# Patient Record
Sex: MALE | Race: BLACK OR AFRICAN AMERICAN | NOT HISPANIC OR LATINO | Employment: UNEMPLOYED | URBAN - METROPOLITAN AREA
[De-identification: names, ages, dates, MRNs, and addresses within clinical notes are randomized per-mention and may not be internally consistent; named-entity substitution may affect disease eponyms.]

---

## 2023-05-26 ENCOUNTER — ANCILLARY PROCEDURE (OUTPATIENT)
Dept: GENERAL RADIOLOGY | Facility: CLINIC | Age: 58
End: 2023-05-26
Attending: STUDENT IN AN ORGANIZED HEALTH CARE EDUCATION/TRAINING PROGRAM
Payer: COMMERCIAL

## 2023-05-26 ENCOUNTER — OFFICE VISIT (OUTPATIENT)
Dept: FAMILY MEDICINE | Facility: CLINIC | Age: 58
End: 2023-05-26
Payer: COMMERCIAL

## 2023-05-26 VITALS
SYSTOLIC BLOOD PRESSURE: 162 MMHG | TEMPERATURE: 97.9 F | OXYGEN SATURATION: 94 % | HEIGHT: 73 IN | RESPIRATION RATE: 12 BRPM | HEART RATE: 66 BPM | WEIGHT: 262.6 LBS | BODY MASS INDEX: 34.8 KG/M2 | DIASTOLIC BLOOD PRESSURE: 101 MMHG

## 2023-05-26 DIAGNOSIS — R91.1 NODULE OF LOWER LOBE OF RIGHT LUNG: ICD-10-CM

## 2023-05-26 DIAGNOSIS — G89.29 CHRONIC BILATERAL LOW BACK PAIN, UNSPECIFIED WHETHER SCIATICA PRESENT: ICD-10-CM

## 2023-05-26 DIAGNOSIS — Z76.0 MEDICATION REFILL: ICD-10-CM

## 2023-05-26 DIAGNOSIS — M54.50 CHRONIC BILATERAL LOW BACK PAIN, UNSPECIFIED WHETHER SCIATICA PRESENT: ICD-10-CM

## 2023-05-26 DIAGNOSIS — E78.5 HYPERLIPIDEMIA LDL GOAL <70: ICD-10-CM

## 2023-05-26 DIAGNOSIS — F51.01 PRIMARY INSOMNIA: ICD-10-CM

## 2023-05-26 DIAGNOSIS — I10 BENIGN ESSENTIAL HYPERTENSION: Primary | ICD-10-CM

## 2023-05-26 DIAGNOSIS — J45.20 INTERMITTENT ASTHMA WITHOUT COMPLICATION, UNSPECIFIED ASTHMA SEVERITY: ICD-10-CM

## 2023-05-26 PROBLEM — R79.89 ELEVATED TROPONIN: Status: ACTIVE | Noted: 2022-09-14

## 2023-05-26 PROBLEM — R07.9 ACUTE CHEST PAIN: Status: ACTIVE | Noted: 2022-09-14

## 2023-05-26 PROBLEM — R06.02 SOB (SHORTNESS OF BREATH): Status: ACTIVE | Noted: 2022-09-14

## 2023-05-26 PROBLEM — Z91.199 NON-ADHERENCE TO MEDICAL TREATMENT: Status: ACTIVE | Noted: 2022-09-14

## 2023-05-26 PROBLEM — I16.1 HYPERTENSIVE EMERGENCY: Status: ACTIVE | Noted: 2022-09-14

## 2023-05-26 LAB
ALBUMIN SERPL BCG-MCNC: 4.3 G/DL (ref 3.5–5.2)
ALP SERPL-CCNC: 89 U/L (ref 40–129)
ALT SERPL W P-5'-P-CCNC: 28 U/L (ref 10–50)
ANION GAP SERPL CALCULATED.3IONS-SCNC: 12 MMOL/L (ref 7–15)
AST SERPL W P-5'-P-CCNC: 40 U/L (ref 10–50)
BILIRUB SERPL-MCNC: 0.5 MG/DL
BUN SERPL-MCNC: 14.1 MG/DL (ref 8–23)
CALCIUM SERPL-MCNC: 10.1 MG/DL (ref 8.8–10.2)
CHLORIDE SERPL-SCNC: 104 MMOL/L (ref 98–107)
CHOLEST SERPL-MCNC: 153 MG/DL
CREAT SERPL-MCNC: 1.5 MG/DL (ref 0.67–1.17)
DEPRECATED HCO3 PLAS-SCNC: 22 MMOL/L (ref 22–29)
GFR SERPL CREATININE-BSD FRML MDRD: 53 ML/MIN/1.73M2
GLUCOSE SERPL-MCNC: 125 MG/DL (ref 70–99)
HBA1C MFR BLD: 6.4 % (ref 0–5.6)
HDLC SERPL-MCNC: 40 MG/DL
LDLC SERPL CALC-MCNC: 78 MG/DL
NONHDLC SERPL-MCNC: 113 MG/DL
POTASSIUM SERPL-SCNC: 4.5 MMOL/L (ref 3.4–5.3)
PROT SERPL-MCNC: 7.3 G/DL (ref 6.4–8.3)
SODIUM SERPL-SCNC: 138 MMOL/L (ref 136–145)
TRIGL SERPL-MCNC: 177 MG/DL

## 2023-05-26 PROCEDURE — 72100 X-RAY EXAM L-S SPINE 2/3 VWS: CPT | Mod: TC | Performed by: RADIOLOGY

## 2023-05-26 PROCEDURE — 36415 COLL VENOUS BLD VENIPUNCTURE: CPT

## 2023-05-26 PROCEDURE — 99204 OFFICE O/P NEW MOD 45 MIN: CPT | Mod: GC

## 2023-05-26 PROCEDURE — 83036 HEMOGLOBIN GLYCOSYLATED A1C: CPT

## 2023-05-26 PROCEDURE — 80061 LIPID PANEL: CPT

## 2023-05-26 PROCEDURE — 80053 COMPREHEN METABOLIC PANEL: CPT

## 2023-05-26 RX ORDER — POLYETHYLENE GLYCOL 3350 17 G
2 POWDER IN PACKET (EA) ORAL
COMMUNITY
Start: 2022-09-14

## 2023-05-26 RX ORDER — DILTIAZEM HYDROCHLORIDE 60 MG/1
2 TABLET, FILM COATED ORAL 2 TIMES DAILY
Qty: 10.2 G | Refills: 1 | Status: SHIPPED | OUTPATIENT
Start: 2023-05-26 | End: 2024-02-15

## 2023-05-26 RX ORDER — CALCIUM CARBONATE 500(1250)
500 TABLET,CHEWABLE ORAL
COMMUNITY
Start: 2023-05-12

## 2023-05-26 RX ORDER — ATORVASTATIN CALCIUM 80 MG/1
40 TABLET, FILM COATED ORAL
COMMUNITY
Start: 2023-05-25 | End: 2023-05-26

## 2023-05-26 RX ORDER — ALBUTEROL SULFATE 90 UG/1
1-2 AEROSOL, METERED RESPIRATORY (INHALATION) EVERY 4 HOURS PRN
Qty: 18 G | Refills: 3 | Status: SHIPPED | OUTPATIENT
Start: 2023-05-26 | End: 2024-02-15

## 2023-05-26 RX ORDER — TRAZODONE HYDROCHLORIDE 50 MG/1
50 TABLET, FILM COATED ORAL
COMMUNITY
Start: 2023-05-12 | End: 2023-05-26

## 2023-05-26 RX ORDER — METOPROLOL SUCCINATE 25 MG/1
50 TABLET, EXTENDED RELEASE ORAL DAILY
Qty: 90 TABLET | Refills: 1 | Status: SHIPPED | OUTPATIENT
Start: 2023-05-26 | End: 2023-07-05

## 2023-05-26 RX ORDER — IBUPROFEN 600 MG/1
600 TABLET, FILM COATED ORAL EVERY 6 HOURS PRN
Qty: 30 TABLET | Refills: 1 | Status: SHIPPED | OUTPATIENT
Start: 2023-05-26

## 2023-05-26 RX ORDER — IBUPROFEN 600 MG/1
600 TABLET, FILM COATED ORAL EVERY 8 HOURS PRN
Qty: 60 UNITS | Refills: 0 | Status: SHIPPED | OUTPATIENT
Start: 2023-05-26 | End: 2023-05-26

## 2023-05-26 RX ORDER — NICOTINE 21 MG/24HR
1 PATCH, TRANSDERMAL 24 HOURS TRANSDERMAL
COMMUNITY
Start: 2022-09-15

## 2023-05-26 RX ORDER — LIDOCAINE 4 G/G
1-3 PATCH TOPICAL
COMMUNITY
Start: 2022-07-22

## 2023-05-26 RX ORDER — LISINOPRIL 20 MG/1
20 TABLET ORAL DAILY
Qty: 90 TABLET | Refills: 1 | Status: SHIPPED | OUTPATIENT
Start: 2023-05-26 | End: 2023-11-13

## 2023-05-26 RX ORDER — ATORVASTATIN CALCIUM 80 MG/1
40 TABLET, FILM COATED ORAL DAILY
Qty: 90 TABLET | Refills: 1 | Status: SHIPPED | OUTPATIENT
Start: 2023-05-26 | End: 2023-09-19

## 2023-05-26 RX ORDER — IBUPROFEN 600 MG/1
600 TABLET, FILM COATED ORAL EVERY 8 HOURS PRN
Qty: 90 UNITS | Refills: 1 | Status: SHIPPED | OUTPATIENT
Start: 2023-05-26 | End: 2023-05-26

## 2023-05-26 RX ORDER — ACETAMINOPHEN 325 MG/1
2 TABLET ORAL EVERY 4 HOURS PRN
COMMUNITY
Start: 2023-05-25 | End: 2024-02-15

## 2023-05-26 RX ORDER — METOPROLOL SUCCINATE 25 MG/1
50 TABLET, EXTENDED RELEASE ORAL
COMMUNITY
Start: 2023-05-25 | End: 2023-05-26

## 2023-05-26 RX ORDER — NITROGLYCERIN 0.4 MG/1
0.4 TABLET SUBLINGUAL
COMMUNITY
Start: 2022-09-14 | End: 2023-05-26

## 2023-05-26 RX ORDER — TRAZODONE HYDROCHLORIDE 50 MG/1
50 TABLET, FILM COATED ORAL AT BEDTIME
Qty: 90 TABLET | Refills: 1 | Status: SHIPPED | OUTPATIENT
Start: 2023-05-26 | End: 2023-11-13

## 2023-05-26 RX ORDER — NITROGLYCERIN 0.4 MG/1
0.4 TABLET SUBLINGUAL EVERY 5 MIN PRN
Qty: 10 TABLET | Refills: 1 | Status: SHIPPED | OUTPATIENT
Start: 2023-05-26 | End: 2024-02-15

## 2023-05-26 RX ORDER — AMLODIPINE BESYLATE 10 MG/1
10 TABLET ORAL DAILY
Qty: 90 TABLET | Refills: 1 | Status: SHIPPED | OUTPATIENT
Start: 2023-05-26 | End: 2023-11-13

## 2023-05-26 NOTE — PROGRESS NOTES
"Assessment & Plan:    1.  HTN  Hx of MI  Has been intermittently running out of meds due to taking additional at home. He checks his bp and will do an extra dose if more than 150 systolic. Will titrate up Lisinopril before adding additional agent.   Hx of prior MI \"a few years ago\" in [place]. Has not seen cardiology.  -Discussed he needs to follow up with Cardiology and is likely due for an ECHO and stress test. If he is going back to [place] soon, he should do this there. If he is staying here, would order it to be done. He will let me know at follow up appt 6/5 what he prefers.  -Increase Lisinopril to 20 mg daily  -refill amlodipine 10 mg daily, refill Metoprolol 50 mg XR daily  -BMP, HbA1c   -Follow up scheduled for 6/5/23     2. HLD  No lipid panel on file. He is taking 0.5 tabs so 40 mg daily of Atorvastatin.  -Lipid panel   -HbA1c    3. Asthma  Reports poorly controlled asthma recently with needing to use Albuterol >4 times per week and night time coughing.  -Start Symbicort inhaler 1-2 puffs up to 12 times daily  -Will reorder albuterol in case he is unable to obtain Symbicort    4.  Right lung nodule on previous CXR  Tobacco Use   Smokes 5 cigarettes per day for past 40+ years. CXR from 09/2022 showed right lung nodule that needed follow up CT, has not done this yet.  -CT Chest follow up nodule     5. Insomnia  -Refill trazodone 50 mg at bedtime     6. Chronic lumbar back pain  Has had worsening lower back pain for past year Had a fall previously and really never got back to baseline. May need MRI in future with his pain radiating down his legs.  -Lumbar xrays today  -Ibuprofen 600 mg TID PRN      Guero Hurd is a 58 year old, presenting for the following health issues:  New Patient, Hospital F/U (Seen for back pain and HTN), and Medication Reconciliation (Did not review, needs provider's attention; pt report will inquire increase in dosage for HTN and pain)        5/26/2023     7:57 AM   Additional " "Questions   Roomed by Kelsey   Accompanied by patient(self)     HPI     Patient presents today after being seen in the ER yesterday for medication refill.  He has history of hypertension, previous MI, hyperlipidemia, asthma coming in today to establish care for his medications and with ongoing lower back pain.  He reports that he checks his blood pressure at home and has been consistently greater than 150 or 160 and when he has that he will take an extra blood pressure pill which causes him to run out early.  His history of MI was a few years ago, he does not really remember exactly when it happened it was back in [place].  He has not followed up with cardiology since then.  Patient reports his asthma has been less well controlled lately, thinks he has used his albuterol at least 4 times in the last week and he has been waking up coughing intermittently.  He does smoke cigarettes about 5+ per day and has been doing this for more than 40 years.  Patient tells me he was told he may have cancer at one of his ER visits and never followed up.  He does not know where the cancer was.  Also concern today for lower back pain.  He reports he had a fall somewhere around a year ago where he landed on his butt and his back and he never really recovered from that.  He feels like the pain is progressing and getting worse and feels like he needs to lean forward and use a walker to build to walk around.  Standing is difficult for him.  He has pain radiating down his legs but no weakness or change in sensation, no change in bowel or bladder habits.      Review of Systems   Constitutional, HEENT, cardiovascular, pulmonary, gi and gu systems are negative, except as otherwise noted.      Objective    BP (!) 162/101 (BP Location: Left arm, Patient Position: Sitting, Cuff Size: Adult Large)   Pulse 66   Temp 97.9  F (36.6  C) (Oral)   Resp 12   Ht 1.854 m (6' 1\")   Wt 119.1 kg (262 lb 9.6 oz)   SpO2 94%   BMI 34.65 kg/m    Body mass " index is 34.65 kg/m .  Physical Exam   GENERAL: healthy, alert and no distress  NECK: no adenopathy, no asymmetry, masses, or scars and thyroid normal to palpation  RESP: lungs clear to auscultation - no rales, rhonchi or wheezes  CV: regular rate and rhythm, normal S1 S2, no S3 or S4, no murmur, click or rub, no peripheral edema and peripheral pulses strong  ABDOMEN: soft, nontender, no hepatosplenomegaly, no masses and bowel sounds normal  MS: no gross musculoskeletal defects noted, no edema  NEURO: Normal strength and tone, mentation intact and speech normal  Comprehensive back pain exam:  No tenderness, Pain limits the following motions: Leg extension, Lower extremity strength functional and equal on both sides, Lower extremity sensation normal and equal on both sides and Straight leg positive on  Right and left, indicating possible ipsilateral radiculopathy    I precepted today with Dr Maguire.    Nhi James MD PGY-2

## 2023-05-26 NOTE — PROGRESS NOTES
Preceptor Attestation:  I discussed the patient with the resident and evaluated the patient in person. I have verified the content of the note, which accurately reflects my assessment of the patient and the plan of care.  Supervising Physician:  Nhi Maguire MD.

## 2023-05-26 NOTE — PATIENT INSTRUCTIONS
Thank you for taking the time to discuss your health with me today!    Today we discussed:  We refilled your blood pressure and other medications. We will check some lab work and I will call you with results.   2.  We will check an xray of your back. Take ibuprofen as needed for pain.     As always, please call the clinic or message with any questions or concerns.     Best Wishes,  Nhi James MD.     Your new inhaler is called Symbicort, and is either 160/4.5 mcg or 80/4.5 mcg strength. It contains 2 medicines: a steroid (budesonide) to help prevent asthma attacks, and a bronchodilator (formoterol) to help relieve asthma symptoms when they happen. The bronchodilator works fast to relieve your symptoms right away, and lasts a long time to help you feel better for a longer time than albuterol. This is what it looks like. If your insurance only covers the generic inhaler, it may look different.     This inhaler leads to less asthma attacks, and better control of asthma overall.   Use 1-2 puff upt to 12 times daily as your maintenance dose. You can also use this inhaler as a rescue inhaler for shortness of breath or wheezing. Do not use more than 12 puffs in 1 day.    You can keep track of how many puffs are left in the inhaler by using the dose counter on the top of the inhaler. Your prescription is for 2 inhalers -- This should last you at least 30 days.

## 2023-05-26 NOTE — LETTER
June 5, 2023      Vickey Monroy  [address] [place] [address]        Dear ,    We are writing to inform you of your test results.    I attempted to call you a few times but was unable to reach you.    Your lab work showed:  You are in the pre-diabetes range. You do not have diabetes yet but are high risk for developing it in the future. Please follow up to discuss managing this.  Your kidney function was a bit elevated. We need to repeat this blood work as soon as possible and monitor it with increasing your blood pressure medication. Please call and schedule follow up as soon as possible.  Your back xray showed some degenerative disease but no acute fracture.     Resulted Orders   Hemoglobin A1c   Result Value Ref Range    Hemoglobin A1C 6.4 (H) 0.0 - 5.6 %      Comment:      Normal <5.7%   Prediabetes 5.7-6.4%    Diabetes 6.5% or higher     Note: Adopted from ADA consensus guidelines.   Comprehensive metabolic panel   Result Value Ref Range    Sodium 138 136 - 145 mmol/L    Potassium 4.5 3.4 - 5.3 mmol/L    Chloride 104 98 - 107 mmol/L    Carbon Dioxide (CO2) 22 22 - 29 mmol/L    Anion Gap 12 7 - 15 mmol/L    Urea Nitrogen 14.1 8.0 - 23.0 mg/dL    Creatinine 1.50 (H) 0.67 - 1.17 mg/dL    Calcium 10.1 8.8 - 10.2 mg/dL    Glucose 125 (H) 70 - 99 mg/dL    Alkaline Phosphatase 89 40 - 129 U/L    AST 40 10 - 50 U/L    ALT 28 10 - 50 U/L    Protein Total 7.3 6.4 - 8.3 g/dL    Albumin 4.3 3.5 - 5.2 g/dL    Bilirubin Total 0.5 <=1.2 mg/dL    GFR Estimate 53 (L) >60 mL/min/1.73m2      Comment:      eGFR calculated using 2021 CKD-EPI equation.   Lipid panel reflex to direct LDL Non-fasting   Result Value Ref Range    Cholesterol 153 <200 mg/dL    Triglycerides 177 (H) <150 mg/dL    Direct Measure HDL 40 >=40 mg/dL    LDL Cholesterol Calculated 78 <=100 mg/dL    Non HDL Cholesterol 113 <130 mg/dL    Narrative    Cholesterol  Desirable:  <200 mg/dL    Triglycerides  Normal:  Less than 150 mg/dL  Borderline High:   150-199 mg/dL  High:  200-499 mg/dL  Very High:  Greater than or equal to 500 mg/dL    Direct Measure HDL  Female:  Greater than or equal to 50 mg/dL   Male:  Greater than or equal to 40 mg/dL    LDL Cholesterol  Desirable:  <100mg/dL  Above Desirable:  100-129 mg/dL   Borderline High:  130-159 mg/dL   High:  160-189 mg/dL   Very High:  >= 190 mg/dL    Non HDL Cholesterol  Desirable:  130 mg/dL  Above Desirable:  130-159 mg/dL  Borderline High:  160-189 mg/dL  High:  190-219 mg/dL  Very High:  Greater than or equal to 220 mg/dL       If you have any questions or concerns, please call the clinic at the number listed above.       Sincerely,      Nhi James MD

## 2023-06-14 ENCOUNTER — HOSPITAL ENCOUNTER (OUTPATIENT)
Dept: CT IMAGING | Facility: HOSPITAL | Age: 58
Discharge: HOME | End: 2023-06-14
Attending: STUDENT IN AN ORGANIZED HEALTH CARE EDUCATION/TRAINING PROGRAM | Admitting: STUDENT IN AN ORGANIZED HEALTH CARE EDUCATION/TRAINING PROGRAM
Payer: COMMERCIAL

## 2023-06-14 DIAGNOSIS — R91.1 NODULE OF LOWER LOBE OF RIGHT LUNG: ICD-10-CM

## 2023-06-14 PROCEDURE — 71250 CT THORAX DX C-: CPT

## 2023-07-24 DIAGNOSIS — E04.1 THYROID NODULE: Primary | ICD-10-CM

## 2023-09-19 ENCOUNTER — OFFICE VISIT (OUTPATIENT)
Dept: FAMILY MEDICINE | Facility: CLINIC | Age: 58
End: 2023-09-19
Payer: COMMERCIAL

## 2023-09-19 VITALS
SYSTOLIC BLOOD PRESSURE: 154 MMHG | DIASTOLIC BLOOD PRESSURE: 91 MMHG | HEART RATE: 63 BPM | BODY MASS INDEX: 33.91 KG/M2 | WEIGHT: 257 LBS | OXYGEN SATURATION: 96 % | RESPIRATION RATE: 22 BRPM | TEMPERATURE: 98.2 F

## 2023-09-19 DIAGNOSIS — M70.61 GREATER TROCHANTERIC BURSITIS OF BOTH HIPS: ICD-10-CM

## 2023-09-19 DIAGNOSIS — M53.3 SI (SACROILIAC) JOINT DYSFUNCTION: ICD-10-CM

## 2023-09-19 DIAGNOSIS — M70.62 GREATER TROCHANTERIC BURSITIS OF BOTH HIPS: ICD-10-CM

## 2023-09-19 DIAGNOSIS — G89.29 CHRONIC BILATERAL LOW BACK PAIN, UNSPECIFIED WHETHER SCIATICA PRESENT: Primary | ICD-10-CM

## 2023-09-19 DIAGNOSIS — M54.50 CHRONIC BILATERAL LOW BACK PAIN, UNSPECIFIED WHETHER SCIATICA PRESENT: Primary | ICD-10-CM

## 2023-09-19 DIAGNOSIS — E78.5 HYPERLIPIDEMIA LDL GOAL <70: ICD-10-CM

## 2023-09-19 PROCEDURE — 99214 OFFICE O/P EST MOD 30 MIN: CPT | Mod: GC

## 2023-09-19 RX ORDER — ATORVASTATIN CALCIUM 80 MG/1
80 TABLET, FILM COATED ORAL DAILY
Qty: 90 TABLET | Refills: 1 | Status: SHIPPED | OUTPATIENT
Start: 2023-09-19 | End: 2024-02-07

## 2023-09-19 RX ORDER — METHYLPREDNISOLONE 4 MG
TABLET, DOSE PACK ORAL
Qty: 21 TABLET | Refills: 0 | Status: SHIPPED | OUTPATIENT
Start: 2023-09-19

## 2023-09-19 ASSESSMENT — ENCOUNTER SYMPTOMS
BACK PAIN: 1
PARESTHESIAS: 0
NUMBNESS: 0

## 2023-09-19 NOTE — PROGRESS NOTES
"  Assessment & Plan     Chronic bilateral low back pain, without sciatica present  SI (sacroiliac) joint dysfunction  Patient presents with low back pain that has been present for months.  Reports the pain is in the center of his lower back, and also the muscles adjacent to it.  The pain also radiates into his buttock region bilaterally.  Lumbar spine x-ray shows significant osteophytes throughout the lumbar vertebrae.  He has tried Tylenol, ibuprofen, lidocaine patch, and heat with minimal relief.  - methylPREDNISolone (MEDROL DOSEPAK) 4 MG tablet therapy pack  Dispense: 21 tablet; Refill: 0  - Spine  Referral    Greater trochanteric bursitis of both hips  Patient has tenderness to palpation over the bilateral greater trochanters.  States it is painful while walking.  Patient associates this with his back pain  - methylPREDNISolone (MEDROL DOSEPAK) 4 MG tablet therapy pack  Dispense: 21 tablet; Refill: 0    Hyperlipidemia LDL goal <70  Patient states he has been taking 80 mg of atorvastatin (1 full tablet) instead of the 40 mg he has been prescribed.  He states that the only way he can get his numbers down  - atorvastatin (LIPITOR) 80 MG tablet  Dispense: 90 tablet; Refill: 1     BMI:   Estimated body mass index is 33.91 kg/m  as calculated from the following:    Height as of 5/26/23: 1.854 m (6' 1\").    Weight as of this encounter: 116.6 kg (257 lb).     Return in about 6 weeks (around 10/31/2023), or if symptoms worsen or fail to improve.    Mina Hernández DO  [facility] LUCI Hurd is a 58 year old, presenting for the following health issues:  Other (Med refills, sharp pin in the lower body)      9/19/2023    10:28 AM   Additional Questions   Roomed by alesha   Accompanied by self       Patient complains of low back pain, buttock pain, and lateral hip pain that has been present for the past few months.  Pain is worse when standing and walking.  He denies any numbness or " tingling to his legs.  He has tried ibuprofen, Tylenol, heat, and lidocaine patches with minimal relief.  He is interested in receiving a stronger pain medicine to help with this.  Patient also notes that he has been doubling up his atorvastatin dose in order to get the cholesterol numbers that he wants.  He stated that the 40 mg (0.5 tablet) was ineffective, and he started taking a full tablet (80 mg) to achieve better results.  He is wondering if he can just get prescribed a full tablet.       Review of Systems   Musculoskeletal:  Positive for back pain.   Neurological:  Negative for numbness and paresthesias.   All other systems reviewed and are negative.         Objective    BP (!) 154/91   Pulse 63   Temp 98.2  F (36.8  C) (Oral)   Resp 22   Wt 116.6 kg (257 lb)   SpO2 96%   BMI 33.91 kg/m    Body mass index is 33.91 kg/m .  Physical Exam  Vitals reviewed.   Constitutional:       Appearance: Normal appearance. He is obese.   Eyes:      Extraocular Movements: Extraocular movements intact.      Conjunctiva/sclera: Conjunctivae normal.   Pulmonary:      Effort: Pulmonary effort is normal.   Musculoskeletal:         General: Tenderness present.      Comments: Tenderness to palpation over the midline lumbar spine, lumbar paraspinal muscles, SI joints bilaterally, and bilateral greater trochanters.  Pain worsens with back flexion, extension, right and left rotation, and right and left sidebending.  Sensation intact to light touch all distal dermatomes   Neurological:      General: No focal deficit present.      Mental Status: He is alert and oriented to person, place, and time.   Psychiatric:         Mood and Affect: Mood normal.         Behavior: Behavior normal.         Thought Content: Thought content normal.         Judgment: Judgment normal.

## 2023-09-19 NOTE — PROGRESS NOTES
Preceptor Attestation:    I discussed the patient with the resident and evaluated the patient in person. I have verified the content of the note, which accurately reflects my assessment of the patient and the plan of care.   Supervising Physician:  Joselito Medel MD.

## 2023-09-19 NOTE — PATIENT INSTRUCTIONS
Thank you for coming in to see us today!    - I prescribed a steroid pack that has a tapered dose. Follow the instructions on the pack EXACTLY as they're written. Some side effects are increased appetite, irritability, and anxiousness/jitteriness.    1st day: Take 2 tablets before breakfast, 1 tablet after lunch, 1 tablet after supper, and 2 tablets at bedtime.  2nd day: Take 1 tablet before breakfast, 1 tablet after lunch, 1 tablet after supper, and 2 tablets at bedtime.  3rd day: Take 1 tablet before breakfast, 1 tablet after lunch, 1 tablet after supper, and 1 tablet at bedtime.  4th day: Take 1 tablet before breakfast, 1 tablet after lunch, and 1 tablet at bedtime.  5th day: Take 1 tablet before breakfast and 1 tablet at bedtime.  6th day: Take 1 tablet before breakfast.    - I placed a referral to a spine specialist for evaluation  - Continue using heat and Tylenol for pain management. Do NOT take ibuprofen while taking the steroid    Mina Hernández, DO

## 2023-11-12 DIAGNOSIS — Z76.0 MEDICATION REFILL: ICD-10-CM

## 2023-11-12 DIAGNOSIS — F51.01 PRIMARY INSOMNIA: ICD-10-CM

## 2023-11-13 RX ORDER — TRAZODONE HYDROCHLORIDE 50 MG/1
50 TABLET, FILM COATED ORAL AT BEDTIME
Qty: 90 TABLET | Refills: 1 | Status: SHIPPED | OUTPATIENT
Start: 2023-11-13 | End: 2024-02-15

## 2023-11-13 RX ORDER — LISINOPRIL 20 MG/1
20 TABLET ORAL DAILY
Qty: 90 TABLET | Refills: 0 | Status: SHIPPED | OUTPATIENT
Start: 2023-11-13 | End: 2024-02-15

## 2023-11-13 RX ORDER — AMLODIPINE BESYLATE 10 MG/1
10 TABLET ORAL DAILY
Qty: 90 TABLET | Refills: 1 | Status: SHIPPED | OUTPATIENT
Start: 2023-11-13 | End: 2024-02-15

## 2023-11-17 ENCOUNTER — TELEPHONE (OUTPATIENT)
Dept: FAMILY MEDICINE | Facility: CLINIC | Age: 58
End: 2023-11-17

## 2023-11-17 DIAGNOSIS — Z76.0 MEDICATION REFILL: ICD-10-CM

## 2023-11-17 RX ORDER — LISINOPRIL 20 MG/1
20 TABLET ORAL DAILY
Qty: 90 TABLET | Refills: 0 | Status: CANCELLED | OUTPATIENT
Start: 2023-11-17

## 2023-11-17 NOTE — TELEPHONE ENCOUNTER
[facility] phone call message- patient requesting a refill:    Full Medication Name: lisinopril and his other bp medication     Dose: 20 mg    Pharmacy confirmed as       [id number] - [place], [place] - [address]  [address]  [place] [address]  Phone: [phone] Fax: [phone]  : Yes    Additional Comments:      OK to leave a message on voice mail? Yes    Primary language: English      needed? No    Call taken on November 17, 2023 at 9:24 AM by Ray Forte

## 2023-11-17 NOTE — TELEPHONE ENCOUNTER
Pt called back to check on the status of this medication.  He let me know that he is currently out of medication and would appreciate it if it could be dealt with prior to the weekend.  Please call when completed.

## 2024-01-31 ENCOUNTER — DOCUMENTATION ONLY (OUTPATIENT)
Dept: FAMILY MEDICINE | Facility: CLINIC | Age: 59
End: 2024-01-31
Payer: COMMERCIAL

## 2024-01-31 NOTE — PROGRESS NOTES
Forms/Letter Request    Type of form/letter: OTHER: [employer] Acknowledgement of Donor Participation in the Plasmapheresis Program        Do we have the form/letter: Yes: Walk in form    Who is the form from? Patient    Where did/will the form come from? Patient or family brought in       When is form/letter needed by: as soon as possible     How would you like the form/letter returned: Fax : [phone]    Patient Notified form requests are processed in 5-7 business days:Yes    Okay to leave a detailed message?: Yes at Home number on file [phone] (home)

## 2024-02-01 NOTE — PROGRESS NOTES
[facility] phone call message- general phone call:    Reason for call: Vickey called for forms status and would like a return call. He was informed that form that was dropped off yesterday can take from 5-7 days to complete. He still would like a return call with status.    Return call needed: Yes    OK to leave a message on voice mail? No    Primary language: English      needed? No    Call taken on February 1, 2024 at 11:16 AM by Nicolle Tinsley

## 2024-02-05 NOTE — PROGRESS NOTES
[facility] phone call message- general phone call:    Reason for call: Vickey is calling for a status on form and would like a return call.    Return call needed: Yes    OK to leave a message on voice mail? Yes    Primary language: English      needed? No    Call taken on February 5, 2024 at 1:10 PM by Nicolle Tinsley

## 2024-02-05 NOTE — PROGRESS NOTES
To be completed in Nursing note:    Please reference list for forms that require a visit for completion.  Please remind patients that providers are given 3-5 business days to complete and return forms.      Form type:  Donor participation form    Date form received: 24    Date form completed by Physician:  24    How was form returned to patient (mailed, faxed, or at  for patient to ):  Faxed to [facility] assistant [phone]    Date form mailed/faxed/left at  for patient and sent to HIM for scannin24      Once form is left for patient, faxed, or mailed PCS will then close the documentation only encounter.

## 2024-02-07 DIAGNOSIS — E78.5 HYPERLIPIDEMIA LDL GOAL <70: ICD-10-CM

## 2024-02-07 RX ORDER — ATORVASTATIN CALCIUM 80 MG/1
80 TABLET, FILM COATED ORAL DAILY
Qty: 90 TABLET | Refills: 1 | Status: SHIPPED | OUTPATIENT
Start: 2024-02-07 | End: 2024-02-15

## 2024-02-07 NOTE — TELEPHONE ENCOUNTER
Medication requested: ATORVASTATIN 80MG TABLETS   Last office visit: 9/19/23  Future [facility] appointments: 2/15/24  Medication last refilled: 11/25/23  Last qualifying labs:   Component      Latest Ref Rng 5/26/2023  9:05 AM   Cholesterol      <200 mg/dL 153    Triglycerides      <150 mg/dL 177 (H)    HDL Cholesterol      >=40 mg/dL 40    LDL Cholesterol Calculated      <=100 mg/dL 78    Non HDL Cholesterol      <130 mg/dL 113       Component      Latest Ref Rng 5/26/2023  9:05 AM   ALT      10 - 50 U/L 28        Prescription approved per [facility] Refill Protocol.    Antihyperlipidemic agents Besobu2202/07/2024 01:27 PM   Protocol Details Lipid panel on file in past 12 mos    Normal serum ALT on record in past 12 mos    Recent (12 mo) or future (30 days) visit within the authorizing provider's specialty    Medication is active on med list    Patient is age 18 years or older     VANCE Hicks, BSN

## 2024-02-15 ENCOUNTER — OFFICE VISIT (OUTPATIENT)
Dept: FAMILY MEDICINE | Facility: CLINIC | Age: 59
End: 2024-02-15
Payer: COMMERCIAL

## 2024-02-15 VITALS
RESPIRATION RATE: 16 BRPM | WEIGHT: 259.6 LBS | HEART RATE: 72 BPM | OXYGEN SATURATION: 96 % | BODY MASS INDEX: 34.4 KG/M2 | DIASTOLIC BLOOD PRESSURE: 93 MMHG | HEIGHT: 73 IN | TEMPERATURE: 96.8 F | SYSTOLIC BLOOD PRESSURE: 143 MMHG

## 2024-02-15 DIAGNOSIS — J45.20 INTERMITTENT ASTHMA WITHOUT COMPLICATION, UNSPECIFIED ASTHMA SEVERITY: ICD-10-CM

## 2024-02-15 DIAGNOSIS — E78.5 HYPERLIPIDEMIA LDL GOAL <70: ICD-10-CM

## 2024-02-15 DIAGNOSIS — I10 BENIGN ESSENTIAL HYPERTENSION: ICD-10-CM

## 2024-02-15 DIAGNOSIS — R73.03 PREDIABETES: ICD-10-CM

## 2024-02-15 DIAGNOSIS — M54.50 CHRONIC BILATERAL LOW BACK PAIN, UNSPECIFIED WHETHER SCIATICA PRESENT: ICD-10-CM

## 2024-02-15 DIAGNOSIS — F51.01 PRIMARY INSOMNIA: ICD-10-CM

## 2024-02-15 DIAGNOSIS — Z76.0 MEDICATION REFILL: ICD-10-CM

## 2024-02-15 DIAGNOSIS — Z12.11 SCREEN FOR COLON CANCER: Primary | ICD-10-CM

## 2024-02-15 DIAGNOSIS — G89.29 CHRONIC BILATERAL LOW BACK PAIN, UNSPECIFIED WHETHER SCIATICA PRESENT: ICD-10-CM

## 2024-02-15 LAB
ALBUMIN SERPL BCG-MCNC: 3.9 G/DL (ref 3.5–5.2)
ALP SERPL-CCNC: 80 U/L (ref 40–150)
ALT SERPL W P-5'-P-CCNC: 12 U/L (ref 0–70)
ANION GAP SERPL CALCULATED.3IONS-SCNC: 9 MMOL/L (ref 7–15)
AST SERPL W P-5'-P-CCNC: 26 U/L (ref 0–45)
BILIRUB SERPL-MCNC: 0.6 MG/DL
BUN SERPL-MCNC: 13.9 MG/DL (ref 8–23)
CALCIUM SERPL-MCNC: 9.2 MG/DL (ref 8.8–10.2)
CHLORIDE SERPL-SCNC: 106 MMOL/L (ref 98–107)
CHOLEST SERPL-MCNC: 118 MG/DL
CREAT SERPL-MCNC: 1.63 MG/DL (ref 0.67–1.17)
DEPRECATED HCO3 PLAS-SCNC: 23 MMOL/L (ref 22–29)
EGFRCR SERPLBLD CKD-EPI 2021: 48 ML/MIN/1.73M2
FASTING STATUS PATIENT QL REPORTED: ABNORMAL
GLUCOSE SERPL-MCNC: 127 MG/DL (ref 70–99)
HBA1C MFR BLD: 6.1 % (ref 0–5.6)
HDLC SERPL-MCNC: 36 MG/DL
LDLC SERPL CALC-MCNC: 59 MG/DL
NONHDLC SERPL-MCNC: 82 MG/DL
POTASSIUM SERPL-SCNC: 5 MMOL/L (ref 3.4–5.3)
PROT SERPL-MCNC: 6.7 G/DL (ref 6.4–8.3)
SODIUM SERPL-SCNC: 138 MMOL/L (ref 135–145)
TRIGL SERPL-MCNC: 116 MG/DL

## 2024-02-15 PROCEDURE — 36415 COLL VENOUS BLD VENIPUNCTURE: CPT

## 2024-02-15 PROCEDURE — 83036 HEMOGLOBIN GLYCOSYLATED A1C: CPT

## 2024-02-15 PROCEDURE — 80061 LIPID PANEL: CPT

## 2024-02-15 PROCEDURE — 80053 COMPREHEN METABOLIC PANEL: CPT

## 2024-02-15 PROCEDURE — 99214 OFFICE O/P EST MOD 30 MIN: CPT | Mod: GC

## 2024-02-15 RX ORDER — METOPROLOL SUCCINATE 25 MG/1
50 TABLET, EXTENDED RELEASE ORAL DAILY
Qty: 180 TABLET | Refills: 1 | Status: SHIPPED | OUTPATIENT
Start: 2024-02-15 | End: 2024-08-23

## 2024-02-15 RX ORDER — BUDESONIDE AND FORMOTEROL FUMARATE DIHYDRATE 80; 4.5 UG/1; UG/1
2 AEROSOL RESPIRATORY (INHALATION) 2 TIMES DAILY
Qty: 10.2 G | Refills: 1 | Status: SHIPPED | OUTPATIENT
Start: 2024-02-15

## 2024-02-15 RX ORDER — ACETAMINOPHEN 325 MG/1
650 TABLET ORAL EVERY 4 HOURS PRN
Qty: 60 TABLET | Refills: 3 | Status: SHIPPED | OUTPATIENT
Start: 2024-02-15

## 2024-02-15 RX ORDER — NITROGLYCERIN 0.4 MG/1
0.4 TABLET SUBLINGUAL EVERY 5 MIN PRN
Qty: 10 TABLET | Refills: 1 | Status: SHIPPED | OUTPATIENT
Start: 2024-02-15

## 2024-02-15 RX ORDER — AMLODIPINE BESYLATE 10 MG/1
10 TABLET ORAL DAILY
Qty: 90 TABLET | Refills: 1 | Status: SHIPPED | OUTPATIENT
Start: 2024-02-15 | End: 2024-08-23

## 2024-02-15 RX ORDER — TRAZODONE HYDROCHLORIDE 50 MG/1
50 TABLET, FILM COATED ORAL AT BEDTIME
Qty: 90 TABLET | Refills: 1 | Status: SHIPPED | OUTPATIENT
Start: 2024-02-15

## 2024-02-15 RX ORDER — LISINOPRIL 20 MG/1
20 TABLET ORAL DAILY
Qty: 90 TABLET | Refills: 0 | Status: SHIPPED | OUTPATIENT
Start: 2024-02-15 | End: 2024-04-05

## 2024-02-15 RX ORDER — ALBUTEROL SULFATE 90 UG/1
1-2 AEROSOL, METERED RESPIRATORY (INHALATION) EVERY 4 HOURS PRN
Qty: 18 G | Refills: 3 | Status: SHIPPED | OUTPATIENT
Start: 2024-02-15

## 2024-02-15 RX ORDER — ATORVASTATIN CALCIUM 80 MG/1
80 TABLET, FILM COATED ORAL DAILY
Qty: 90 TABLET | Refills: 1 | Status: SHIPPED | OUTPATIENT
Start: 2024-02-15 | End: 2024-08-23

## 2024-02-15 ASSESSMENT — ASTHMA QUESTIONNAIRES
QUESTION_1 LAST FOUR WEEKS HOW MUCH OF THE TIME DID YOUR ASTHMA KEEP YOU FROM GETTING AS MUCH DONE AT WORK, SCHOOL OR AT HOME: SOME OF THE TIME
QUESTION_5 LAST FOUR WEEKS HOW WOULD YOU RATE YOUR ASTHMA CONTROL: WELL CONTROLLED
QUESTION_2 LAST FOUR WEEKS HOW OFTEN HAVE YOU HAD SHORTNESS OF BREATH: THREE TO SIX TIMES A WEEK
QUESTION_3 LAST FOUR WEEKS HOW OFTEN DID YOUR ASTHMA SYMPTOMS (WHEEZING, COUGHING, SHORTNESS OF BREATH, CHEST TIGHTNESS OR PAIN) WAKE YOU UP AT NIGHT OR EARLIER THAN USUAL IN THE MORNING: TWO OR THREE NIGHTS A WEEK
ACT_TOTALSCORE: 15
QUESTION_4 LAST FOUR WEEKS HOW OFTEN HAVE YOU USED YOUR RESCUE INHALER OR NEBULIZER MEDICATION (SUCH AS ALBUTEROL): TWO OR THREE TIMES PER WEEK
ACT_TOTALSCORE: 15

## 2024-02-15 NOTE — PROGRESS NOTES
Preceptor Attestation:    I discussed the patient with the resident and evaluated the patient in person. I have verified the content of the note, which accurately reflects my assessment of the patient and the plan of care.   Supervising Physician:  Mel Devlin MD

## 2024-02-15 NOTE — PROGRESS NOTES
Assessment & Plan     Screen for colon cancer  Has never had colonoscopy. Denies any changes in bowel movements or blood in stool.  - Colonoscopy Screening  Referral    Chronic bilateral low back pain, unspecified whether sciatica present  Xray lumbar spine from last year showing multilevel degenerative disc disease and scoliosis. Exam with paraspinal muscle tenderness and weakness of hip flexors. Gait antalgic. Discussed activity modification, PT, voltaren gel. Ibuprofen didn't help.  - Physical Therapy Referral  - LUMBAR ORTHOSIS, FLEXIBLE, PROVIDES LUMBAR SUPPORT, POSTERIOR EXTENDS FROM L-1  - diclofenac (VOLTAREN) 1 % topical gel  Dispense: 350 g; Refill: 3    Hyperlipidemia LDL goal <70  Statin refilled  - Lipid panel reflex to direct LDL Fasting  - Lipid panel reflex to direct LDL Fasting    Benign essential hypertension  BP meds refilled. BP high here 143/93. No symptoms.   - Comprehensive metabolic panel  - Comprehensive metabolic panel    Prediabetes  We discussed role of weight loss with helping A1c, lipids, and back pain. At our next visit we will consider starting GLP-1.   - Hemoglobin A1c  - Hemoglobin A1c        Return in about 1 month (around 3/15/2024) for Follow up.    Guero Hurd is a 58 year old, presenting for the following health issues:  Medication Follow-up (Need stronger pain meds. Current alternating ibuprofen and tylenol. Atorvastatin is also not working. ), Dme (Cane and back brace ), and Refill Request (All medication )        2/15/2024    10:41 AM   Additional Questions   Roomed by    Accompanied by self     HPI     58 year old male hx of HTN, HLD, prediabetes, chronic low back pain here for med refill. Wondering about back brace and a cane. Not working due to back pain. Has known degenerative arthritis of lumbar and hips. On tylenol, ibuprofen and lido patch for back pain he says its not helping. Has not seen PT. Endorses AM and PM stiffness. Pain with prolonged  "sitting and driving. On amlodipine, losartan and metoprolol. HLD on atorvastatin 80 mg. Last A1c 6.4 in May. ACT of 15 today.   Smokes 5-6 cigarettes per day for 20 years and marijuana daily.       Review of Systems  Constitutional, HEENT, cardiovascular, pulmonary, gi and gu systems are negative, except as otherwise noted.      Objective    BP (!) 143/93 (BP Location: Left arm, Patient Position: Sitting, Cuff Size: Adult Large)   Pulse 72   Temp 96.8  F (36  C) (Tympanic)   Resp 16   Ht 1.854 m (6' 0.99\")   Wt 117.8 kg (259 lb 9.6 oz)   SpO2 96%   BMI 34.26 kg/m    Body mass index is 34.26 kg/m .  Physical Exam   GENERAL: alert and no distress  EYES: Eyes grossly normal to inspection, PERRL and conjunctivae and sclerae normal  Hands: clubbing of fingers  RESP: lungs clear to auscultation - no rales, rhonchi or wheezes  CV: regular rate and rhythm, normal S1 S2, no S3 or S4, no murmur, click or rub, no peripheral edema  ABDOMEN: soft, nontender, no hepatosplenomegaly, no masses and bowel sounds normal  MS: positive stork test . Antalgic gait  NEURO: Normal strength and tone, mentation intact and speech normal  Comprehensive back pain exam:  Tenderness of paraspinal lumbar muscles, Pain limits the following motions: extension and flexion, and Demonstrates weakness in  bilateral hip flexors, indicating possible L2 nerve involvement  PSYCH: mentation appears normal, affect normal/bright            Signed Electronically by: Anthony Aquino MD    "

## 2024-02-15 NOTE — PATIENT INSTRUCTIONS
Nice to see you today! Here is a list of what we discussed:  Labs today and meds refilled  2.   Back brace ordered  3.   Follow up with Dr Fuller  4.   Physical therapy  5.   Start voltaren     If you have any questions or need further assistance, please call the clinic at [phone] or send me a [employer] message.  Thank you for trusting me with your care.    Anthony Aquino MD   [facility]

## 2024-04-05 DIAGNOSIS — Z76.0 MEDICATION REFILL: ICD-10-CM

## 2024-04-08 RX ORDER — LISINOPRIL 20 MG/1
20 TABLET ORAL DAILY
Qty: 90 TABLET | Refills: 0 | Status: SHIPPED | OUTPATIENT
Start: 2024-04-08 | End: 2024-08-23

## 2024-06-28 ENCOUNTER — DOCUMENTATION ONLY (OUTPATIENT)
Dept: FAMILY MEDICINE | Facility: CLINIC | Age: 59
End: 2024-06-28
Payer: COMMERCIAL

## 2024-06-28 NOTE — PROGRESS NOTES
To be completed in Nursing note:    Please reference list for forms that require a visit for completion.  Please remind patients that providers are given 3-5 business days to complete and return forms.      Form type:Professional Statement of need    Date form received: 24    Date form completed by Physician:24    How was form returned to patient (mailed, faxed, or at  for patient to ):Faxed    Date form mailed/faxed/left at  for patient and sent to HIM for scannin/3/24      Once form is left for patient, faxed, or mailed PCS will then close the documentation only encounter.

## 2024-08-23 DIAGNOSIS — I10 BENIGN ESSENTIAL HYPERTENSION: ICD-10-CM

## 2024-08-23 DIAGNOSIS — E78.5 HYPERLIPIDEMIA LDL GOAL <70: ICD-10-CM

## 2024-08-23 DIAGNOSIS — Z76.0 MEDICATION REFILL: ICD-10-CM

## 2024-08-23 RX ORDER — METOPROLOL SUCCINATE 25 MG/1
50 TABLET, EXTENDED RELEASE ORAL DAILY
Qty: 180 TABLET | Refills: 1 | Status: SHIPPED | OUTPATIENT
Start: 2024-08-23

## 2024-08-23 RX ORDER — LISINOPRIL 20 MG/1
20 TABLET ORAL DAILY
Qty: 90 TABLET | Refills: 0 | Status: SHIPPED | OUTPATIENT
Start: 2024-08-23

## 2024-08-23 RX ORDER — AMLODIPINE BESYLATE 10 MG/1
10 TABLET ORAL DAILY
Qty: 90 TABLET | Refills: 1 | Status: SHIPPED | OUTPATIENT
Start: 2024-08-23

## 2024-08-23 RX ORDER — ATORVASTATIN CALCIUM 80 MG/1
80 TABLET, FILM COATED ORAL DAILY
Qty: 90 TABLET | Refills: 1 | Status: SHIPPED | OUTPATIENT
Start: 2024-08-23

## 2024-08-23 NOTE — LETTER
[facility] [address] [place] [address]  Phone: [phone]  Fax: [phone]        August 26, 2024      Vickey Monroy                                                                                                                                [address] [place] [address]            Dear Mr. Monroy,    We are concerned about your health care.  We recently provided you with a medication refill.  Many medications require routine follow-up with your Doctor.      At this time we ask that: You schedule a routine office visit with your physician to follow your medication check .    Your prescription: Has been refilled for 1 month so you may have time for the above noted follow-up.      Thank you,      Dr. Guevara ( [facility] )

## 2024-08-23 NOTE — TELEPHONE ENCOUNTER
Name from pharmacy: LISINOPRIL 20MG TABLETS          Will file in chart as: lisinopril (ZESTRIL) 20 MG tablet    Sig: TAKE 1 TABLET(20 MG) BY MOUTH DAILY    Disp: 90 tablet    Refills: 0 (Pharmacy requested: Not specified)    Start: 8/23/2024    Class: E-Prescribe    Non-formulary For: Medication refill    Last ordered: 4 months ago (4/8/2024) by Abelardo Trotter MD    Last refill: 5/19/2024    Rx #: [id number]    ACE Inhibitors (Including Combos) Protocol Xasjab6308/23/2024 10:13 AM   Protocol Details Blood pressure under 140/90 in past 12 months- Clinicial or Patient Reported    Medication indicated for associated diagnosis        VANCE Hicks, BSN

## 2024-08-23 NOTE — TELEPHONE ENCOUNTER
amLODIPine (NORVASC) 10 MG tablet         Sig: Take 1 tablet (10 mg) by mouth daily.    Disp: 90 tablet    Refills: 1    Start: 8/23/2024    Class: E-Prescribe    For: Medication refill    Last ordered: 6 months ago (2/15/2024) by Mel Devlin MD    Calcium Channel Blockers Protocol  Ouypdj9608/23/2024 11:16 AM   Protocol Details Blood pressure under 140/90 in past 12 months    GFR is on file in the past 12 months and most recent GFR is normal    Medication is active on med list    Recent (12 mo) or future (90 days) visit within the authorizing provider's specialty    Patient is age 18 or older       atorvastatin (LIPITOR) 80 MG tablet         Sig: Take 1 tablet (80 mg) by mouth daily.    Disp: 90 tablet    Refills: 1    Start: 8/23/2024    Class: E-Prescribe    For: Hyperlipidemia LDL goal <70    Last ordered: 6 months ago (2/15/2024) by Mel Devlin MD    Antihyperlipidemic agents Pzrsdb9508/23/2024 11:16 AM   Protocol Details LDL on file in the past 12 months    Medication is active on med list    Recent (12 mo) or future (90 days) visit within the authorizing provider's specialty    Patient is age 18 years or older       metoprolol succinate ER (TOPROL XL) 25 MG 24 hr tablet         Sig: Take 2 tablets (50 mg) by mouth daily.    Disp: 180 tablet    Refills: 1    Start: 8/23/2024    Class: E-Prescribe    For: Benign essential hypertension    Last ordered: 6 months ago (2/15/2024) by Mel Devlin MD    Beta-Blockers Protocol Smxyhc1408/23/2024 11:16 AM   Protocol Details Blood pressure under 140/90 in past 12 months        VANCE Hicks, BSN

## 2024-08-26 NOTE — TELEPHONE ENCOUNTER
Attempt to reach pt by phone. Phone number is invalid. Letter mailed out    Sugey Colbert, RASHIDA 1:13 PM August 26, 2024

## 2024-10-09 ENCOUNTER — TELEPHONE (OUTPATIENT)
Dept: FAMILY MEDICINE | Facility: CLINIC | Age: 59
End: 2024-10-09
Payer: COMMERCIAL

## 2024-10-09 NOTE — TELEPHONE ENCOUNTER
Patient Quality Outreach    Patient is due for the following:   Hypertension -  BP check and Hypertension follow-up visit    Next Steps:   Need appt    Type of outreach:    Called, phone number is not in service and letter sent.      Questions for provider review:    None           Dora Alexander

## 2024-10-09 NOTE — LETTER
October 9, 2024      Vickey Monroy  [address] [place] [address]        To  Vickey Monroy  [address] [place] [address]    Your team at [facility] cares about your health. We have reviewed your chart and based on our findings; we are making the following recommendations to better manage your health.     You are in particular need of attention regarding the following:     HYPERTENSION FOLLOW UP: Office Visit    If you have already completed these items, please contact the clinic via phone or   [employer] so your care team can review and update your records. Thank you for   choosing [facility] for your healthcare needs. For any questions,   concerns, or to schedule an appointment please contact our clinic.    Healthy Regards,      Your [facility] Care Team

## 2024-11-13 DIAGNOSIS — E78.5 HYPERLIPIDEMIA LDL GOAL <70: ICD-10-CM

## 2024-11-13 DIAGNOSIS — I10 BENIGN ESSENTIAL HYPERTENSION: ICD-10-CM

## 2024-11-13 RX ORDER — ATORVASTATIN CALCIUM 80 MG/1
80 TABLET, FILM COATED ORAL DAILY
Qty: 90 TABLET | Refills: 1 | Status: SHIPPED | OUTPATIENT
Start: 2024-11-13

## 2024-11-13 RX ORDER — METOPROLOL SUCCINATE 25 MG/1
50 TABLET, EXTENDED RELEASE ORAL DAILY
Qty: 180 TABLET | Refills: 1 | Status: SHIPPED | OUTPATIENT
Start: 2024-11-13

## 2024-11-14 DIAGNOSIS — G89.29 CHRONIC BILATERAL LOW BACK PAIN, UNSPECIFIED WHETHER SCIATICA PRESENT: ICD-10-CM

## 2024-11-14 DIAGNOSIS — M54.50 CHRONIC BILATERAL LOW BACK PAIN, UNSPECIFIED WHETHER SCIATICA PRESENT: ICD-10-CM

## 2024-11-14 DIAGNOSIS — Z76.0 MEDICATION REFILL: ICD-10-CM

## 2024-11-14 NOTE — TELEPHONE ENCOUNTER
Medication Question or Refill        What medication are you calling about (include dose and sig)?: amLODIPine (NORVASC) 10 MG tablet - Take 1 tablet (10 mg) by mouth daily. - Oral     acetaminophen (TYLENOL) 325 MG tablet - simin 2 tablets (650 mg) by mouth every 4 hours as needed - Oral     Preferred Pharmacy:      [id number] - [place], [place] - [address]  [address]  [place] [address]  Phone: [phone] Fax: [phone]      Controlled Substance Agreement on file:   CSA -- Patient Level:    CSA: None found at the patient level.       Who prescribed the medication?: juan jose and dm    Do you need a refill? Yes    When did you use the medication last? Amlodipine a couple of days ago.  Patient is currently out of this medication.  Tylenol - yesterday    Patient offered an appointment? No    Do you have any questions or concerns?  No      Okay to leave a detailed message?: Yes at Other phone number:  [phone]      Does patient or caller know when to expect a call? Yes, PCS will return call within 24 business hours.       Alec Cha on 11/14/2024 at 11:59 AM

## 2024-11-14 NOTE — TELEPHONE ENCOUNTER
amLODIPine (NORVASC) 10 MG tablet         Sig: Take 1 tablet (10 mg) by mouth daily.    Disp: 90 tablet    Refills: 1    Start: 11/14/2024    Class: E-Prescribe    Non-formulary For: Medication refill    Last ordered: 2 months ago (8/23/2024) by Kathryn Crowell MD    Calcium Channel Blockers Protocol  Svqusk6811/14/2024 04:32 PM   Protocol Details Most recent blood pressure under 140/90 in past 12 months    GFR is on file in the past 12 months and most recent GFR is normal    Medication is active on med list    Recent (12 mo) or future (90 days) visit within the authorizing provider's specialty    Patient is age 18 or older       acetaminophen (TYLENOL) 325 MG tablet         Sig: Take 2 tablets (650 mg) by mouth every 4 hours as needed.    Disp: 60 tablet    Refills: 3    Start: 11/14/2024    Class: E-Prescribe    Non-formulary For: Chronic bilateral low back pain, unspecified whether sciatica present    Last ordered: 9 months ago (2/15/2024) by Mel Devlin MD    Analgesics (Non-Narcotic Tylenol and ASA Only) Neoubb6011/14/2024 04:32 PM   Protocol Details Patient is 7 months old or older    Medication is active on med list    Medication matches indication    Recent (12 mo) or future (90 days) visit within the authorizing provider's specialty            Montrell Rosenberg, RN, MSN

## 2024-11-15 DIAGNOSIS — Z76.0 MEDICATION REFILL: ICD-10-CM

## 2024-11-18 RX ORDER — AMLODIPINE BESYLATE 10 MG/1
10 TABLET ORAL DAILY
Qty: 30 TABLET | Refills: 0 | Status: SHIPPED | OUTPATIENT
Start: 2024-11-18

## 2024-11-18 RX ORDER — LISINOPRIL 20 MG/1
20 TABLET ORAL DAILY
Qty: 30 TABLET | Refills: 0 | Status: SHIPPED | OUTPATIENT
Start: 2024-11-18

## 2024-11-18 RX ORDER — ACETAMINOPHEN 325 MG/1
650 TABLET ORAL EVERY 4 HOURS PRN
Qty: 60 TABLET | Refills: 3 | Status: SHIPPED | OUTPATIENT
Start: 2024-11-18

## 2024-11-18 NOTE — TELEPHONE ENCOUNTER
Name from pharmacy: LISINOPRIL 20MG TABLETS          Will file in chart as: lisinopril (ZESTRIL) 20 MG tablet    Sig: TAKE 1 TABLET(20 MG) BY MOUTH DAILY    Disp: 90 tablet    Refills: 0 (Pharmacy requested: Not specified)    Start: 11/15/2024    Class: E-Prescribe    Non-formulary For: Medication refill    Last ordered: 2 months ago (8/23/2024) by Kathryn Crowell MD    Last refill: 8/23/2024    Rx #: [id number]    ACE Inhibitors (Including Combos) Protocol Abrvma74/15/2024 05:24 PM   Protocol Details Most recent blood pressure under 140/90 in past 12 months- Clinicial or Patient Reported    Medication indicated for associated diagnosis          Montrell Rosenberg RN, MSN

## 2025-01-06 ENCOUNTER — TELEPHONE (OUTPATIENT)
Dept: FAMILY MEDICINE | Facility: CLINIC | Age: 60
End: 2025-01-06
Payer: COMMERCIAL

## 2025-01-06 NOTE — TELEPHONE ENCOUNTER
Patient Quality Outreach    Patient is due for the following:   Hypertension -  BP check and Hypertension follow-up visit    Action(s) Taken:   Need appt     Type of outreach:    Unable to called or left message.    Questions for provider review:    None       RASHIDA Kaur

## 2025-02-14 DIAGNOSIS — Z76.0 MEDICATION REFILL: ICD-10-CM

## 2025-02-14 RX ORDER — ALBUTEROL SULFATE 90 UG/1
1-2 INHALANT RESPIRATORY (INHALATION) EVERY 4 HOURS PRN
Qty: 18 G | Refills: 3 | Status: CANCELLED | OUTPATIENT
Start: 2025-02-14

## 2025-02-20 DIAGNOSIS — Z76.0 MEDICATION REFILL: ICD-10-CM

## 2025-02-20 RX ORDER — AMLODIPINE BESYLATE 10 MG/1
10 TABLET ORAL DAILY
Qty: 14 TABLET | Refills: 0 | Status: SHIPPED | OUTPATIENT
Start: 2025-02-20

## 2025-02-20 NOTE — TELEPHONE ENCOUNTER
"Trying calling pt's 2 times but the phone number on the list says \" the number you had dial,  can not be dial\"      Loren Carranza MA    "

## 2025-02-20 NOTE — TELEPHONE ENCOUNTER
amLODIPine (NORVASC) 10 MG tablet         Sig: Take 1 tablet (10 mg) by mouth daily.    Disp: 30 tablet    Refills: 0    Start: 2/20/2025    Class: E-Prescribe    Non-formulary For: Medication refill    Last ordered: 3 months ago (11/18/2024) by Agus Guevara MD    Calcium Channel Blockers Protocol  Nmfboa8202/20/2025 09:07 AM   Protocol Details Most recent blood pressure under 140/90 in past 12 months    Medication is indicated for associated diagnosis    GFR is on file in the past 12 months and most recent GFR is normal    Recent (12 mo) or future (90 days) visit within the authorizing provider's specialty          Montrell Rosenberg RN, MSN

## 2025-02-28 NOTE — TELEPHONE ENCOUNTER
February 18, 2025  Agus Guevara MD to Me   MB    2/18/25  1:38 PM  Pt need appointment before, I refill his albuterol.

## 2025-03-03 ENCOUNTER — TELEPHONE (OUTPATIENT)
Dept: FAMILY MEDICINE | Facility: CLINIC | Age: 60
End: 2025-03-03
Payer: COMMERCIAL

## 2025-03-03 DIAGNOSIS — Z76.0 MEDICATION REFILL: ICD-10-CM

## 2025-03-03 RX ORDER — AMLODIPINE BESYLATE 10 MG/1
10 TABLET ORAL DAILY
Qty: 14 TABLET | Refills: 0 | OUTPATIENT
Start: 2025-03-03

## 2025-03-03 NOTE — TELEPHONE ENCOUNTER
Medication Question or Refill    Contacts       Contact Date/Time Type Contact Phone/Fax    03/03/2025 10:22 AM CST Phone (Incoming) Porfirio Vickey T (Self) [phone] (H)        What medication are you calling about (include dose and sig)?: amLODIPine (NORVASC) 10 MG tablet     Preferred Pharmacy: [facility] #[id number] - [place], [place] - [address] AT [facility] [address]  [facility] [address]  [place] [address]  Phone: [phone] Fax: [phone]    Controlled Substance Agreement on file:   CSA -- Patient Level:    CSA: None found at the patient level.       Who prescribed the medication?: Agus Guevara  Do you need a refill? Yes  Patient offered an appointment? Yes: he is asking for the refill. He would like to try to get the refill without the appointment.     Do you have any questions or concerns?  Yes: he would like a refill sent to his pharmacy.  Okay to leave a detailed message?: Yes at Cell number on file:    Telephone Information:   Mobile [phone]

## 2025-03-03 NOTE — TELEPHONE ENCOUNTER
03/03/25    Spoke to Patient regarding excessive no-shows. Reviewed No-show policy and the importance of coming to their appointments. They have voiced understanding and have agreed to call or use their MyChart to cancel their appointments. They are aware if they continue to no-show appointments, they may only be eligible for same day appointments, if available.    Was MyChart offered to patient?  No, Pt declined/refused to use MyChart.    Was Text Reminders offered to patient?  Yes. Verified Patient is receiving text reminders and reviewed Communication Preferences.    Les Smith on 3/3/2025 at 10:40 AM

## 2025-03-04 ENCOUNTER — ANCILLARY PROCEDURE (OUTPATIENT)
Dept: GENERAL RADIOLOGY | Facility: CLINIC | Age: 60
End: 2025-03-04
Attending: STUDENT IN AN ORGANIZED HEALTH CARE EDUCATION/TRAINING PROGRAM
Payer: COMMERCIAL

## 2025-03-04 ENCOUNTER — OFFICE VISIT (OUTPATIENT)
Dept: FAMILY MEDICINE | Facility: CLINIC | Age: 60
End: 2025-03-04
Payer: COMMERCIAL

## 2025-03-04 VITALS
RESPIRATION RATE: 18 BRPM | HEART RATE: 68 BPM | BODY MASS INDEX: 34.97 KG/M2 | TEMPERATURE: 98.2 F | WEIGHT: 265 LBS | SYSTOLIC BLOOD PRESSURE: 161 MMHG | OXYGEN SATURATION: 95 % | DIASTOLIC BLOOD PRESSURE: 110 MMHG

## 2025-03-04 DIAGNOSIS — Z76.0 MEDICATION REFILL: ICD-10-CM

## 2025-03-04 DIAGNOSIS — G89.29 CHRONIC BILATERAL LOW BACK PAIN, UNSPECIFIED WHETHER SCIATICA PRESENT: ICD-10-CM

## 2025-03-04 DIAGNOSIS — M54.50 CHRONIC BILATERAL LOW BACK PAIN, UNSPECIFIED WHETHER SCIATICA PRESENT: ICD-10-CM

## 2025-03-04 DIAGNOSIS — G89.29 CHRONIC BILATERAL LOW BACK PAIN WITHOUT SCIATICA: Primary | ICD-10-CM

## 2025-03-04 DIAGNOSIS — I10 BENIGN ESSENTIAL HYPERTENSION: ICD-10-CM

## 2025-03-04 DIAGNOSIS — E78.5 HYPERLIPIDEMIA LDL GOAL <70: ICD-10-CM

## 2025-03-04 DIAGNOSIS — M54.50 CHRONIC BILATERAL LOW BACK PAIN WITHOUT SCIATICA: Primary | ICD-10-CM

## 2025-03-04 LAB
ALBUMIN SERPL BCG-MCNC: 4 G/DL (ref 3.5–5.2)
ALBUMIN UR-MCNC: NEGATIVE MG/DL
ALP SERPL-CCNC: 95 U/L (ref 40–150)
ALT SERPL W P-5'-P-CCNC: 20 U/L (ref 0–70)
ANION GAP SERPL CALCULATED.3IONS-SCNC: 8 MMOL/L (ref 7–15)
APPEARANCE UR: CLEAR
AST SERPL W P-5'-P-CCNC: 27 U/L (ref 0–45)
BASOPHILS # BLD AUTO: 0.1 10E3/UL (ref 0–0.2)
BASOPHILS NFR BLD AUTO: 1 %
BILIRUB SERPL-MCNC: 0.5 MG/DL
BILIRUB UR QL STRIP: NEGATIVE
BUN SERPL-MCNC: 16.4 MG/DL (ref 8–23)
CALCIUM SERPL-MCNC: 9.9 MG/DL (ref 8.8–10.4)
CHLORIDE SERPL-SCNC: 106 MMOL/L (ref 98–107)
CHOLEST SERPL-MCNC: 115 MG/DL
COLOR UR AUTO: YELLOW
CREAT SERPL-MCNC: 1.72 MG/DL (ref 0.67–1.17)
EGFRCR SERPLBLD CKD-EPI 2021: 45 ML/MIN/1.73M2
EOSINOPHIL # BLD AUTO: 0.2 10E3/UL (ref 0–0.7)
EOSINOPHIL NFR BLD AUTO: 3 %
ERYTHROCYTE [DISTWIDTH] IN BLOOD BY AUTOMATED COUNT: 13.2 % (ref 10–15)
EST. AVERAGE GLUCOSE BLD GHB EST-MCNC: 126 MG/DL
FASTING STATUS PATIENT QL REPORTED: ABNORMAL
FASTING STATUS PATIENT QL REPORTED: ABNORMAL
GLUCOSE SERPL-MCNC: 107 MG/DL (ref 70–99)
GLUCOSE UR STRIP-MCNC: NEGATIVE MG/DL
HBA1C MFR BLD: 6 % (ref 0–5.6)
HCO3 SERPL-SCNC: 27 MMOL/L (ref 22–29)
HCT VFR BLD AUTO: 47.8 % (ref 40–53)
HDLC SERPL-MCNC: 39 MG/DL
HGB BLD-MCNC: 15.8 G/DL (ref 13.3–17.7)
HGB UR QL STRIP: NEGATIVE
IMM GRANULOCYTES # BLD: 0 10E3/UL
IMM GRANULOCYTES NFR BLD: 0 %
KETONES UR STRIP-MCNC: NEGATIVE MG/DL
LDLC SERPL CALC-MCNC: 60 MG/DL
LEUKOCYTE ESTERASE UR QL STRIP: NEGATIVE
LYMPHOCYTES # BLD AUTO: 1.9 10E3/UL (ref 0.8–5.3)
LYMPHOCYTES NFR BLD AUTO: 28 %
MCH RBC QN AUTO: 27.3 PG (ref 26.5–33)
MCHC RBC AUTO-ENTMCNC: 33.1 G/DL (ref 31.5–36.5)
MCV RBC AUTO: 83 FL (ref 78–100)
MONOCYTES # BLD AUTO: 0.6 10E3/UL (ref 0–1.3)
MONOCYTES NFR BLD AUTO: 8 %
NEUTROPHILS # BLD AUTO: 4 10E3/UL (ref 1.6–8.3)
NEUTROPHILS NFR BLD AUTO: 59 %
NITRATE UR QL: NEGATIVE
NONHDLC SERPL-MCNC: 76 MG/DL
PH UR STRIP: 5.5 [PH] (ref 5–8)
PLATELET # BLD AUTO: 251 10E3/UL (ref 150–450)
POTASSIUM SERPL-SCNC: 4.8 MMOL/L (ref 3.4–5.3)
PROT SERPL-MCNC: 7.2 G/DL (ref 6.4–8.3)
RBC # BLD AUTO: 5.78 10E6/UL (ref 4.4–5.9)
SODIUM SERPL-SCNC: 141 MMOL/L (ref 135–145)
SP GR UR STRIP: 1.02 (ref 1–1.03)
TRIGL SERPL-MCNC: 79 MG/DL
UROBILINOGEN UR STRIP-ACNC: 0.2 E.U./DL
WBC # BLD AUTO: 6.8 10E3/UL (ref 4–11)

## 2025-03-04 PROCEDURE — 73522 X-RAY EXAM HIPS BI 3-4 VIEWS: CPT | Mod: TC | Performed by: RADIOLOGY

## 2025-03-04 RX ORDER — ACETAMINOPHEN 325 MG/1
650 TABLET ORAL EVERY 4 HOURS PRN
Qty: 60 TABLET | Refills: 3 | Status: SHIPPED | OUTPATIENT
Start: 2025-03-04

## 2025-03-04 RX ORDER — AMLODIPINE BESYLATE 10 MG/1
10 TABLET ORAL DAILY
Qty: 14 TABLET | Refills: 0 | Status: SHIPPED | OUTPATIENT
Start: 2025-03-04

## 2025-03-04 RX ORDER — IBUPROFEN 600 MG/1
600 TABLET, FILM COATED ORAL EVERY 6 HOURS PRN
Qty: 30 TABLET | Refills: 1 | Status: SHIPPED | OUTPATIENT
Start: 2025-03-04

## 2025-03-04 RX ORDER — ATORVASTATIN CALCIUM 80 MG/1
80 TABLET, FILM COATED ORAL DAILY
Qty: 90 TABLET | Refills: 1 | Status: SHIPPED | OUTPATIENT
Start: 2025-03-04

## 2025-03-04 RX ORDER — LOSARTAN POTASSIUM AND HYDROCHLOROTHIAZIDE 25; 100 MG/1; MG/1
1 TABLET ORAL DAILY
Qty: 45 TABLET | Refills: 0 | Status: SHIPPED | OUTPATIENT
Start: 2025-03-04

## 2025-03-04 RX ORDER — METOPROLOL SUCCINATE 25 MG/1
50 TABLET, EXTENDED RELEASE ORAL DAILY
Qty: 180 TABLET | Refills: 1 | Status: SHIPPED | OUTPATIENT
Start: 2025-03-04

## 2025-03-04 ASSESSMENT — ASTHMA QUESTIONNAIRES
QUESTION_5 LAST FOUR WEEKS HOW WOULD YOU RATE YOUR ASTHMA CONTROL: WELL CONTROLLED
QUESTION_2 LAST FOUR WEEKS HOW OFTEN HAVE YOU HAD SHORTNESS OF BREATH: ONCE OR TWICE A WEEK
QUESTION_4 LAST FOUR WEEKS HOW OFTEN HAVE YOU USED YOUR RESCUE INHALER OR NEBULIZER MEDICATION (SUCH AS ALBUTEROL): TWO OR THREE TIMES PER WEEK
ACT_TOTALSCORE: 15
ACT_TOTALSCORE: 15
QUESTION_3 LAST FOUR WEEKS HOW OFTEN DID YOUR ASTHMA SYMPTOMS (WHEEZING, COUGHING, SHORTNESS OF BREATH, CHEST TIGHTNESS OR PAIN) WAKE YOU UP AT NIGHT OR EARLIER THAN USUAL IN THE MORNING: FOUR OR MORE NIGHTS A WEEK
QUESTION_1 LAST FOUR WEEKS HOW MUCH OF THE TIME DID YOUR ASTHMA KEEP YOU FROM GETTING AS MUCH DONE AT WORK, SCHOOL OR AT HOME: SOME OF THE TIME

## 2025-03-04 NOTE — PROGRESS NOTES
Assessment & Plan     # Chronic bilateral low back pain without sciatica  The patient has a worsening one year history of lumbar and bilateral hip pain.  The patient reports that generalized movements are painful. He had a previous xray of his lumbar spine in 2023 which showed Levoconvex scoliosis and multi level degenerative disc disease with disc height loss most pronounced at the L4/L5.  Pelvic were not documented.  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  Also discussed physical therapy as another treatment modality; patient agrees to have a referral for physical therapy.  - XR Pelvis and Hip Bilateral 2 Views pending  - ibuprofen (ADVIL/MOTRIN) 600 MG tablet prescribed, take every 6 hours as needed for moderate pain, 30 tablets, 1 refill  - acetaminophen (TYLENOL) 325 MG tablet, take 2 tablets every 4 hours for pain as needed, 60 tablets, 3 refills  - Physical Therapy  Referral    # Benign essential hypertension  (primary encounter diagnosis)  # Medication refill  Patient has a long standing history of hypertension.  Blood pressure today is 161/110.  Patient currently takes metoprolol 25 mg and amlodipine 10 mg.  Was prescribed lisinopril 20 mg daily but he noticed heart palpitations after taking lisinopril.  He stopped taking this medication for a week, but last night he took 1 tablet of lisinopril but noticed heart palpitations again.  Patient does not have any labs in the last year to reassess kidney function; see labs below that are pending.  Will refill the below medications and prescribe losartan-hydrochlorothiazide 100-25 mg tablets for blood pressure control.  Will follow-up in 1 month for blood pressure recheck with Hyzaar and amlodipine  medication recheck.  Of note, the patient is currently on metoprolol; per ED note in 2022, this was considered to change to carvedilol for alpha blockade but the patient reported intolerance to carvedilol back in [place].  - losartan-hydrochlorothiazide (HYZAAR) 100-25 MG tablet daily, 45 tablets, no refills  - metoprolol succinate ER (TOPROL XL) 25 MG 24 hr tablet, 2 tablets daily, 180 tablets, 1 refill  - amLODIPine (NORVASC) 10 MG tablet, 1 tablet daily, 14 tablets no refills  - Home Blood Pressure Monitor Order for DME - ONLY FOR DME  - Comprehensive  metabolic panel pending,  - CBC with Platelets & Differential pending  - UA Macroscopic with reflex to Microscopic and Culture pending  - Lipid panel reflex to direct LDL Fasting pending  -  Hemoglobin A1c pending    # Hyperlipidemia LDL goal <70  # Medication refill  Patient wishes for atorvastatin refill. Last lipid panel was done February 2024; discussed with patient about pursuing another lipid panel for this year before his annual visit. Patient agrees with obtaining lipid panel.   - Lipid panel pending.   - atorvastatin (LIPITOR) 80 MG tablet, daily, 90 tablets; 1 refill.    Nicotine/Tobacco Cessation  He reports that he has been smoking cigarettes. He does not have any smokeless tobacco history on file.    Nicotine/Tobacco Cessation Plan  Information offered: Patient not interested at this time; patient does not like the patches or gum as it tastes like tobacco.     Guero Hurd is a 59 year old, presenting for the following health issues:  Medication Refill and Arthritis        3/4/2025     8:52 AM   Additional Questions   Roomed by Jason EVANS   Accompanied by Self         3/4/2025    Information    services provided? No     HPI    Vickey Monroy is a 59 year old male who presents today for medication refills and bilateral hip pain.   1) The patient reports of bilateral hip pain with generalized movement for the past 12 months. He notes  "of \"bone on bone\" and burning pain when he tries to move his legs. He takes multiple tablets of ibuprofen and tylenol in one day with some improvement of pain. The patient lives across the street from the clinic; it took him 20 minutes to walk across the street due to having to take breaks due to pain. 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  Patient denies of saddle anesthesia, urinary or bowel incontinence, or numbness or tingling to toes and feet.  Denies of recent falls.  2) Patient wants a medication refills of amlodipine 10 mg, metoprolol 25 mg, and atorvastatin 80 mg.  Associated history: 1/2-1 PPD since 12 years old. Smokes marijuana daily.      Objective    BP (!) 161/110   Pulse 68   Temp 98.2  F (36.8  C) (Tympanic)   Resp 18   Wt 120.2 kg (265 lb)   SpO2 95%   BMI 34.97 kg/m    Body mass index is 34.97 kg/m .    Physical Exam   Constitutional: Awake, alert, cooperative, no apparent distress, and appears stated age  Eyes: Lids and lashes normal, conjunctiva normal  ENT: Normocephalic, without obvious abnormality, atraumatic  Respiratory: No increased work of breathing, good air exchange, clear to auscultation bilaterally, no crackles or wheezing  Cardiovascular: Regular rate and rhythm, normal S1 and S2, no S3 or S4, and no murmur noted  GI: No scars, normal bowel sounds, soft, non-distended, non-tender, no masses palpated, no hepatosplenomegaly  Musculoskeletal: Lumbar spinal tenderness with palpation. Tenderness to bilateral lateral hips with palpation.  Range of motion is limited due to pain in bilateral hips.  Straight leg test for bilateral lower extremities positive secondary to pain.  Patient unable to participate in PURA due to pain.  There is no redness, warmth, or swelling of the joints.   Neurologic: Awake, alert, oriented to name, place and time.  Cranial nerves II-XII are grossly intact.  Sensory is intact.  "   Neuropsychiatric: General: normal, calm, and normal eye contact       I staffed this patient with Attending Physician, Dr. Lev Fuller.    Signed Electronically by: Susan Brown DO, PGY1

## 2025-03-06 NOTE — PROGRESS NOTES
Physician Attestation   I, Lev Fuller MD, saw this patient and agree with the findings and plan of care as documented in the note.      Items personally reviewed/procedural attestation: vitals.    Lev Fuller MD

## 2025-04-10 DIAGNOSIS — F51.01 PRIMARY INSOMNIA: ICD-10-CM

## 2025-04-10 RX ORDER — TRAZODONE HYDROCHLORIDE 50 MG/1
50 TABLET ORAL AT BEDTIME
Qty: 90 TABLET | Refills: 1 | Status: SHIPPED | OUTPATIENT
Start: 2025-04-10

## 2025-04-14 DIAGNOSIS — Z76.0 MEDICATION REFILL: ICD-10-CM

## 2025-04-14 RX ORDER — ALBUTEROL SULFATE 90 UG/1
1-2 INHALANT RESPIRATORY (INHALATION) EVERY 4 HOURS PRN
Qty: 18 G | Refills: 3 | Status: SHIPPED | OUTPATIENT
Start: 2025-04-14

## 2025-04-14 NOTE — TELEPHONE ENCOUNTER
albuterol (PROAIR HFA) 108 (90 Base) MCG/ACT inhaler         Sig: Inhale 1-2 puffs into the lungs every 4 hours as needed for shortness of breath, wheezing or cough. [ALBUTEROL (PROAIR HFA;PROVENTIL HFA;VENTOLIN HFA) 90 MCG/ACTUATION INHALER] Inhale 2 puffs every 4 (four) hours as needed for wheezing.    Disp: 18 g    Refills: 3    Start: 4/14/2025    Class: E-Prescribe    Non-formulary For: Medication refill    To pharmacy: Pharmacy may dispense brand covered by insurance (Proair, or proventil or ventolin or generic albuterol inhaler)    Last ordered: 1 year ago (2/15/2024) by Mel Devlin MD    Short-Acting Beta Agonist Inhalers Protocol  Hsckhb3304/14/2025 04:52 PM   Protocol Details Patient is age 12 or older    Medication is active on med list and the sig matches. RN to manually verify dose and sig if red X/fail.    Recent (12 mo) or future (90 days) visit within the authorizing provider's specialty             Prescription approved per [facility] Refill Protocol.    Montrell Rosenberg, RN, MSN